# Patient Record
Sex: FEMALE | Race: WHITE | NOT HISPANIC OR LATINO | Employment: FULL TIME | URBAN - NONMETROPOLITAN AREA
[De-identification: names, ages, dates, MRNs, and addresses within clinical notes are randomized per-mention and may not be internally consistent; named-entity substitution may affect disease eponyms.]

---

## 2017-03-14 ENCOUNTER — OFFICE VISIT (OUTPATIENT)
Dept: OTOLARYNGOLOGY | Facility: CLINIC | Age: 47
End: 2017-03-14

## 2017-03-14 VITALS — BODY MASS INDEX: 29.45 KG/M2 | WEIGHT: 150 LBS | TEMPERATURE: 98.5 F | HEIGHT: 60 IN

## 2017-03-14 DIAGNOSIS — K11.21 ACUTE SIALOADENITIS: Primary | ICD-10-CM

## 2017-03-14 PROCEDURE — 99243 OFF/OP CNSLTJ NEW/EST LOW 30: CPT | Performed by: OTOLARYNGOLOGY

## 2017-03-14 RX ORDER — DOXYCYCLINE HYCLATE 100 MG
100 TABLET ORAL 2 TIMES DAILY
COMMUNITY
End: 2017-12-12

## 2017-03-14 RX ORDER — CEFUROXIME AXETIL 500 MG/1
TABLET ORAL
Refills: 0 | COMMUNITY
Start: 2017-02-10 | End: 2017-03-29

## 2017-03-14 NOTE — PROGRESS NOTES
Subjective   Nimco Bocanegra is a 47 y.o. female.   This is a consultation from Dr. Mitchell  History of Present Illness   Patient reports that on March 8 she had an upper respiratory infection.  On March 11 she felt a swelling under her tongue that was tender.  Was given Ceftin.  By the evening of Sunday, March 12 she had a small not under her jaw and then yesterday morning the knot was very large.  Saw Dr. Mitchell who added doxycycline to her medical regimen.  Patient reports that since yesterday swelling is decreased by approximately half.  She's never had anything like this before.  It did not seem to be brought on by eating or any other activity.  She had fever with her sinus infection but is not running a fever now..  No trouble swallowing or breathing.    The following portions of the patient's history were reviewed and updated as appropriate: allergies, current medications, past family history, past medical history, past social history, past surgical history and problem list.      Nimco Bocanegra reports that she has never smoked. She has never used smokeless tobacco. She reports that she does not drink alcohol or use illicit drugs.  Patient is not a tobacco user and has not been counseled for use of tobacco products    Family History   Problem Relation Age of Onset   • Diabetes Mother    • Cancer Mother    • Thyroid disease Mother    • Diabetes Father    • Heart disease Father    • Diabetes Sister    • Cancer Paternal Grandmother          Current Outpatient Prescriptions:   •  albuterol (PROAIR HFA) 108 (90 BASE) MCG/ACT inhaler, Inhale 2 puffs Every 6 (Six) Hours., Disp: , Rfl:   •  cefuroxime (CEFTIN) 500 MG tablet, TAKE 1 TABLET BY MOUTH TWICE DAILY FOR 10 DAYS, Disp: , Rfl: 0  •  cetirizine (zyrTEC) 10 MG tablet, Take 10 mg by mouth Daily., Disp: , Rfl:   •  doxycycline (VIBRAMYICN) 100 MG tablet, Take 100 mg by mouth 2 (Two) Times a Day., Disp: , Rfl:   •  raNITIdine (ZANTAC) 150  MG tablet, Take 150 mg by mouth Daily., Disp: , Rfl:   •  simvastatin (ZOCOR) 20 MG tablet, Take 1 tablet by mouth Every Night., Disp: 30 tablet, Rfl: 12      Past Medical History   Diagnosis Date   • Asthma    • Hyperlipidemia    • Prediabetes    • Screening examination for pulmonary tuberculosis          Review of Systems   Constitutional: Positive for fever.   HENT: Positive for sore throat.    Respiratory: Positive for cough.    All other systems reviewed and are negative.          Objective   Physical Exam  General: Well-developed well-nourished female in no acute distress.  Alert and oriented ×3. Head: Normocephalic. Face: Symmetrical strength and appearance. PERRL. EOMI. Voice:Strong. Speech:Fluent  Ears: External ears no deformity, canals no discharge, tympanic membranes intact clear and mobile bilaterally.  Nose: Nares show no discharge mass polyp or purulence.  Boggy mucosa is present.  No gross external deformity.  Septum: Midline  Oral cavity: Lips and gums without lesions.  Tongue and floor of mouth without lesions.  Parotid and left submandibular duct or unobstructed.  Right submandibular duct shows erythema at the distal end of the duct but no palpable stone.  Bimanual palpation reveals tenderness and enlargement of the right submandibular gland but again no palpable stone in the proximal duct.  No mucosal lesions on the buccal mucosa or vestibule of the mouth.  Pharynx: No erythema exudate mass or ulcer  Neck: No lymphadenopathy.  No thyromegaly.  Trachea and larynx midline.  Right submandibular gland is enlarged and tender to palpation.  No fluctuance.  No masses in either parotid or the left submandibular gland.        Assessment/Plan   Nimco was seen today for neck mass and mouth lesions.    Diagnoses and all orders for this visit:    Acute sialoadenitis        Plan: Given that the patient is already starting to respond to therapy I recommended continuing the Ceftin orally and increasing oral  fluid intake and using lemon drops or other sour material to stimulate salivary flow.  If she continues to improve she is to return in 2 weeks but call sooner for problems.

## 2017-03-29 ENCOUNTER — OFFICE VISIT (OUTPATIENT)
Dept: OTOLARYNGOLOGY | Facility: CLINIC | Age: 47
End: 2017-03-29

## 2017-03-29 VITALS — HEIGHT: 60 IN | WEIGHT: 150 LBS | BODY MASS INDEX: 29.45 KG/M2 | TEMPERATURE: 98.5 F

## 2017-03-29 DIAGNOSIS — K11.20 SIALADENITIS: Primary | ICD-10-CM

## 2017-03-29 PROCEDURE — 99213 OFFICE O/P EST LOW 20 MIN: CPT | Performed by: OTOLARYNGOLOGY

## 2017-03-29 NOTE — PROGRESS NOTES
Subjective   Nimconadia Bocanegra is a 47 y.o. female.       History of Present Illness   Patient was seen previously with what appeared to be an acute right submandibular sialadenitis.  She completed her course of antibiotics and returns for reevaluation.  States she is much improved.  Had 1 episode when she felt some brief swelling under her jaw but used sour candy and this improved rapidly.  No fever      The following portions of the patient's history were reviewed and updated as appropriate: allergies, current medications, past family history, past medical history, past social history, past surgical history and problem list.     reports that she has never smoked. She has never used smokeless tobacco. She reports that she does not drink alcohol or use illicit drugs.   Patient is not a tobacco user and has not been counseled for use of tobacco products      Review of Systems   Constitutional: Negative for fever.           Objective   Physical Exam  Right submandibular gland is no longer enlarged or tender.  There is clear salivary flow from the submandibular ducts and parotid ducts bilaterally.  No palpable stone.      Assessment/Plan   Nimco was seen today for follow-up.    Diagnoses and all orders for this visit:    Sialadenitis      Plan: Recommended the patient continue mildly increased oral intake of water for the next 2-3 weeks along with use of salivary stimulating material 1-2 times a day.  If no further symptoms may try to discontinue these measures and follow me as needed for recurrence.

## 2017-12-12 ENCOUNTER — OFFICE VISIT (OUTPATIENT)
Dept: CARDIOLOGY | Facility: CLINIC | Age: 47
End: 2017-12-12

## 2017-12-12 VITALS
HEART RATE: 79 BPM | WEIGHT: 150 LBS | DIASTOLIC BLOOD PRESSURE: 78 MMHG | HEIGHT: 60 IN | SYSTOLIC BLOOD PRESSURE: 128 MMHG | BODY MASS INDEX: 29.45 KG/M2

## 2017-12-12 DIAGNOSIS — J45.20 MILD INTERMITTENT ASTHMA WITHOUT COMPLICATION: ICD-10-CM

## 2017-12-12 DIAGNOSIS — R73.03 PREDIABETES: Primary | ICD-10-CM

## 2017-12-12 DIAGNOSIS — R00.2 PALPITATION: ICD-10-CM

## 2017-12-12 DIAGNOSIS — E78.49 OTHER HYPERLIPIDEMIA: ICD-10-CM

## 2017-12-12 DIAGNOSIS — R53.83 OTHER FATIGUE: ICD-10-CM

## 2017-12-12 DIAGNOSIS — R07.2 PRECORDIAL PAIN: ICD-10-CM

## 2017-12-12 PROCEDURE — 93000 ELECTROCARDIOGRAM COMPLETE: CPT | Performed by: INTERNAL MEDICINE

## 2017-12-12 PROCEDURE — 99204 OFFICE O/P NEW MOD 45 MIN: CPT | Performed by: INTERNAL MEDICINE

## 2017-12-12 NOTE — PROGRESS NOTES
Nimco Isaac Bocanegra  47 y.o. female    12/12/2017  1. Prediabetes    2. Other hyperlipidemia    3. Mild intermittent asthma without complication    4. Other fatigue    5. Precordial pain    6. Palpitation        History of Present Illness:Chest pain and Palpitation    47 years old patient with a history of hyperlipidemia, bronchial asthma, fluctuating heart rate and chest pain described as pressure-like feeling radiating to left shoulder for 2-3 month.  No syncope reported.  Had episode of dizziness.  Noted fluctuating heart rate.  EKG in the office normal sinus rhythm.  Her father had 2 heart attack at age of mid 50 and he was smoker.  She denies orthopnea, PND, nauseous, vomiting.  Denied fever cough which appeared delay dysuria or hematuria.  Denies intermittent claudications.  EKG done and finding discussed the patient in normal sinus rhythm.Pre Diabetic with hemoglobin A1c 5.6            SUBJECTIVE    Allergies   Allergen Reactions   • Bactrim [Sulfamethoxazole-Trimethoprim]    • Penicillins Hives         Past Medical History:   Diagnosis Date   • Asthma    • Hyperlipidemia    • Prediabetes    • Screening examination for pulmonary tuberculosis          Past Surgical History:   Procedure Laterality Date   • GALLBLADDER SURGERY     • TOTAL ABDOMINAL HYSTERECTOMY WITH SALPINGO OOPHORECTOMY  2003         Family History   Problem Relation Age of Onset   • Diabetes Mother    • Cancer Mother    • Thyroid disease Mother    • Diabetes Father    • Heart disease Father    • Diabetes Sister    • Cancer Paternal Grandmother          Social History     Social History   • Marital status:      Spouse name: N/A   • Number of children: N/A   • Years of education: N/A     Occupational History   • Not on file.     Social History Main Topics   • Smoking status: Never Smoker   • Smokeless tobacco: Never Used   • Alcohol use No      Comment: socially   • Drug use: No   • Sexual activity: Yes     Other Topics Concern   •  "Not on file     Social History Narrative         Current Outpatient Prescriptions   Medication Sig Dispense Refill   • albuterol (PROAIR HFA) 108 (90 BASE) MCG/ACT inhaler Inhale 2 puffs Every 6 (Six) Hours.     • cetirizine (zyrTEC) 10 MG tablet Take 10 mg by mouth Daily.     • simvastatin (ZOCOR) 20 MG tablet Take 1 tablet by mouth Every Night. 30 tablet 12     No current facility-administered medications for this visit.          OBJECTIVE    /78  Pulse 79  Ht 152.4 cm (60\")  Wt 68 kg (150 lb)  BMI 29.29 kg/m2        Review of Systems     Constitutional:  Denies recent weight loss, weight gain, fever or chills, no change in exercise tolerance     HENT:  Denies any hearing loss, epistaxis, hoarseness, or difficulty speaking.     Eyes: No blurry    Respiratory: History of bronchial asthma    Cardiovascular: See H&P    Gastrointestinal:  Denies change in bowel habits, dyspepsia, ulcer disease, hematochezia, or melena.     Endocrine: Negative for cold intolerance, heat intolerance, polydipsia, polyphagia and polyuria. Denies any history of weight change, heat/cold intolerance, polydipsia, polyuria     Genitourinary: Negative.      Musculoskeletal: Denies any history of arthritic symptoms or back problems     Skin:  Denies any change in hair or nails, rashes, or skin lesions.     Allergic/Immunologic: Negative.  Negative for environmental allergies, food allergies and immunocompromised state.     Neurological:  Denies any history of recurrent headaches, strokes, TIA, or seizure disorder.     Hematological: Denies any food allergies, seasonal allergies, bleeding disorders, or lymphadenopathy.     Psychiatric/Behavioral: Denies any history of depression, substance abuse, or change in cognitive function.         Physical Exam     Constitutional: Cooperative, alert and oriented, well-developed, well-nourished, in no acute distress.     HENT:   Head: Normocephalic, normal hair patterns, no masses or " tenderness.  Ears, Nose, and Throat: No gross abnormalities. No pallor or cyanosis. Dentition good.   Eyes: EOMS intact, PERRL, conjunctivae and lids unremarkable. Fundoscopic exam and visual fields not performed.   Neck: No palpable masses or adenopathy, no thyromegaly, no JVD, carotid pulses are full and equal bilaterally and without  Bruits.     Cardiovascular: Regular rhythm, S1 and S2 normal, no S3 or S4. Apical impulse not displaced. No murmurs, gallops, or rubs detected.     Pulmonary/Chest: Chest: normal symmetry, no tenderness to palpation, normal respiratory excursion, no intercostal retraction, no use of accessory muscles.            Pulmonary: Normal breath sounds. No rales or ronchi.    Abdominal: Abdomen soft, bowel sounds normoactive, no masses, no hepatosplenomegaly, non-tender, no bruits.     Musculoskeletal: No deformities, clubbing, cyanosis, erythema, or edema observed. There are no spinal abnormalities noted. Normal muscle strength and tone. Pulses full and equal in all extremities, no bruits auscultated.     Neurological: No gross motor or sensory deficits noted, affect appropriate, oriented to time, person, place.     Skin: Warm and dry to the touch, no apparent skin lesions or masses noted.     Psychiatric: She has a normal mood and affect. Her behavior is normal. Judgment and thought content normal.           ECG 12 Lead  Date/Time: 12/12/2017 1:48 PM  Performed by: TASH SOUSA  Authorized by: TASH SOUSA   Comparison: not compared with previous ECG   Rhythm: sinus rhythm              No results found for: WBC, HGB, HCT, MCV, PLT  Lab Results   Component Value Date    GLUCOSE 94 12/14/2016    BUN 15 12/14/2016    CREATININE 0.8 12/14/2016    CO2 30 12/14/2016    CALCIUM 9.8 12/14/2016    ALBUMIN 4.5 12/14/2016    AST 33 12/14/2016    ALT 36 12/14/2016     No results found for: CHOL  Lab Results   Component Value Date    TRIG 164 12/14/2016     Lab Results   Component Value Date     HDL 50 (L) 12/14/2016     Lab Results   Component Value Date    LDLCALC 98 12/14/2016     No results found for: LDL  No results found for: HDLLDLRATIO  No components found for: CHOLHDL  Lab Results   Component Value Date    HGBA1C 6.0 (H) 12/14/2016     No results found for: TSH, I6VCDZQ, M3YHDTP, THYROIDAB        ASSESSMENT AND PLAN  #1 chest pain #2 palpitations #3 hyperlipidemia    Clinically, no evidence of ongoing ischemia at the time of evaluation.  An no sign of any cardiac decompensation.  EKG done and finding discussed with the patient.  She had chest pain described as pressure-like feeling over the left precordium with element of reproducibility on palpation.  We'll arrange a treadmill stress test and the family history of heart disease and echocardiographic study to evaluate systolic function.  We'll also get to 24-48 hour Holter monitor given history of fluctuating heart rate.  Risk factor lifestyle modification discussed the patient.-Dash diett explained.  We will see her back in 4 to 6 month R depends on patient clinical conditions.  Recommend to continue Zocor for management of hyperlipidemia prior for asthma    Nimco was seen today for chest pain, shortness of breath and loss of consciousness.    Diagnoses and all orders for this visit:    Prediabetes    Other hyperlipidemia    Mild intermittent asthma without complication    Other fatigue    Precordial pain  -     ECG 12 Lead  -     Holter Monitor - 48 Hour; Future  -     Adult Transthoracic Echo Complete W/ Cont if Necessary Per Protocol; Future  -     Treadmill Stress Test; Future    Palpitation  -     ECG 12 Lead  -     Holter Monitor - 48 Hour; Future  -     Adult Transthoracic Echo Complete W/ Cont if Necessary Per Protocol; Future        Anrdea Blood MD  12/12/2017  1:42 PM

## 2018-02-12 ENCOUNTER — OFFICE VISIT (OUTPATIENT)
Dept: OTOLARYNGOLOGY | Facility: CLINIC | Age: 48
End: 2018-02-12

## 2018-02-12 VITALS — BODY MASS INDEX: 27.72 KG/M2 | OXYGEN SATURATION: 97 % | WEIGHT: 146.8 LBS | HEIGHT: 61 IN | HEART RATE: 80 BPM

## 2018-02-12 DIAGNOSIS — R59.0 REACTIVE LYMPHOID HYPERPLASIA (RLH) OF HEAD, FACE AND NECK: Primary | ICD-10-CM

## 2018-02-12 PROCEDURE — 99214 OFFICE O/P EST MOD 30 MIN: CPT | Performed by: OTOLARYNGOLOGY

## 2018-02-12 RX ORDER — CEFUROXIME AXETIL 250 MG/1
250 TABLET ORAL 2 TIMES DAILY
Qty: 20 TABLET | Refills: 0 | Status: SHIPPED | OUTPATIENT
Start: 2018-02-12 | End: 2018-04-13

## 2018-02-12 RX ORDER — MOMETASONE FUROATE 50 UG/1
1 SPRAY, METERED NASAL DAILY
COMMUNITY

## 2018-02-13 NOTE — PROGRESS NOTES
Subjective   Nimco Isaac Bocanegra is a 48 y.o. female.       History of Present Illness   I saw this patient previously with what appeared to be acute right submandibular sialadenitis which improved with treatment.  Returns today because she is had a mass in her face that is somewhat painful.  Her dentist suggested this might be her parotid gland.  Has a second area along her jawline on the right that is been present about the same amount of time, which is to say a few weeks..  This is not varied with eating or chewing.  No associated fever.  Has had some upper respiratory symptoms recently.      The following portions of the patient's history were reviewed and updated as appropriate: allergies, current medications, past family history, past medical history, past social history, past surgical history and problem list.     reports that she has never smoked. She has never used smokeless tobacco. She reports that she does not drink alcohol or use illicit drugs.   Patient is not a tobacco user and has not been counseled for use of tobacco products      Review of Systems   Constitutional: Negative for chills and fever.   HENT: Negative for sore throat.            Objective   Physical Exam  General: Well-developed well-nourished female in no acute distress.  Alert and oriented ×3. Head: Normocephalic. Face: Symmetrical strength and appearance. PERRL. EOMI. Voice:Strong. Speech:Fluent  Ears: External ears no deformity, canals no discharge, tympanic membranes intact clear and mobile bilaterally.  Nose: Nares show no discharge mass polyp or purulence.  Boggy mucosa is present.  No gross external deformity.  Septum: Midline  Oral cavity: Lips and gums without lesions.  Tongue and floor of mouth without lesions.  Parotid and submandibular ducts unobstructed.  No mucosal lesions on the buccal mucosa or vestibule of the mouth.  The mass in question is palpable just anterior to the masseter and substance of the parotid in the  buccal fat pad the right.  It is rubbery and mobile consistent with a fascial chain lymph node.  There is a similar smaller lymph node in the facial notch of the mandible on the right.  Pharynx: No erythema exudate mass or ulcer  Neck: No lymphadenopathy in the cervical chains.  No thyromegaly.  Trachea and larynx midline.  No masses in the parotid or submandibular glands.      Assessment/Plan   Nimco was seen today for right salivary gland difficulty.    Diagnoses and all orders for this visit:    Reactive lymphoid hyperplasia (RLH) of head, face and neck    Other orders  -     cefuroxime (CEFTIN) 250 MG tablet; Take 1 tablet by mouth 2 (Two) Times a Day.          Plan: Reassured the patient that I don't think this is her saliva gland but rather reactive lymphadenopathy.  We'll prescribe Ceftin which she has tolerated previously for 10 days and have her return in 1 month for reevaluation.  Call sooner for problems or worsening symptoms.

## 2018-02-21 ENCOUNTER — HOSPITAL ENCOUNTER (OUTPATIENT)
Dept: CARDIOLOGY | Facility: HOSPITAL | Age: 48
Discharge: HOME | End: 2018-02-21
Attending: INTERNAL MEDICINE | Admitting: INTERNAL MEDICINE

## 2018-02-21 DIAGNOSIS — R07.2 PRECORDIAL PAIN: ICD-10-CM

## 2018-02-21 LAB
BH CV STRESS BP STAGE 1: NORMAL
BH CV STRESS BP STAGE 2: NORMAL
BH CV STRESS BP STAGE 3: NORMAL
BH CV STRESS DURATION MIN STAGE 1: 3
BH CV STRESS DURATION MIN STAGE 2: 3
BH CV STRESS DURATION MIN STAGE 3: 3
BH CV STRESS DURATION SEC STAGE 1: 0
BH CV STRESS DURATION SEC STAGE 2: 0
BH CV STRESS DURATION SEC STAGE 3: 0
BH CV STRESS DURATION SEC STAGE 4: 39
BH CV STRESS GRADE STAGE 1: 10
BH CV STRESS GRADE STAGE 2: 12
BH CV STRESS GRADE STAGE 3: 14
BH CV STRESS GRADE STAGE 4: 16
BH CV STRESS HR STAGE 1: 144
BH CV STRESS HR STAGE 2: 166
BH CV STRESS HR STAGE 3: 181
BH CV STRESS HR STAGE 4: 190
BH CV STRESS METS STAGE 1: 5
BH CV STRESS METS STAGE 2: 7.5
BH CV STRESS METS STAGE 3: 10
BH CV STRESS METS STAGE 4: 13.5
BH CV STRESS PROTOCOL 1: NORMAL
BH CV STRESS RECOVERY BP: NORMAL MMHG
BH CV STRESS RECOVERY HR: 99 BPM
BH CV STRESS SPEED STAGE 1: 1.7
BH CV STRESS SPEED STAGE 2: 2.5
BH CV STRESS SPEED STAGE 3: 3.4
BH CV STRESS SPEED STAGE 4: 4.2
BH CV STRESS STAGE 1: 1
BH CV STRESS STAGE 2: 2
BH CV STRESS STAGE 3: 3
BH CV STRESS STAGE 4: 4
MAXIMAL PREDICTED HEART RATE: 174 BPM
PERCENT MAX PREDICTED HR: 109.2 %
STRESS BASELINE BP: NORMAL MMHG
STRESS BASELINE HR: 76 BPM
STRESS PERCENT HR: 128 %
STRESS POST ESTIMATED WORKLOAD: 11.1 METS
STRESS POST EXERCISE DUR MIN: 9 MIN
STRESS POST EXERCISE DUR SEC: 38 SEC
STRESS POST PEAK BP: NORMAL MMHG
STRESS POST PEAK HR: 190 BPM
STRESS TARGET HR: 148 BPM

## 2018-02-21 PROCEDURE — 93017 CV STRESS TEST TRACING ONLY: CPT

## 2018-02-21 PROCEDURE — 93018 CV STRESS TEST I&R ONLY: CPT | Performed by: INTERNAL MEDICINE

## 2018-02-21 PROCEDURE — 93016 CV STRESS TEST SUPVJ ONLY: CPT | Performed by: INTERNAL MEDICINE

## 2018-02-23 ENCOUNTER — TELEPHONE (OUTPATIENT)
Dept: CARDIOLOGY | Facility: CLINIC | Age: 48
End: 2018-02-23

## 2018-02-23 ENCOUNTER — DOCUMENTATION (OUTPATIENT)
Dept: CARDIOLOGY | Facility: CLINIC | Age: 48
End: 2018-02-23

## 2018-03-15 ENCOUNTER — OFFICE VISIT (OUTPATIENT)
Dept: OTOLARYNGOLOGY | Facility: CLINIC | Age: 48
End: 2018-03-15

## 2018-03-15 VITALS — OXYGEN SATURATION: 99 % | WEIGHT: 141.8 LBS | HEIGHT: 62 IN | BODY MASS INDEX: 26.09 KG/M2

## 2018-03-15 DIAGNOSIS — Z09 RESOLVED CONDITION, FOLLOW-UP: Primary | ICD-10-CM

## 2018-03-15 PROCEDURE — 99213 OFFICE O/P EST LOW 20 MIN: CPT | Performed by: OTOLARYNGOLOGY

## 2018-03-19 NOTE — PROGRESS NOTES
Ankush Basilioci Isaac Bocanegra is a 48 y.o. female.       History of Present Illness   Patient previously had a history of acute right submandible or sialadenitis that it improved with treatment. I saw her on 2/12/18 with what appeared to be a right facial chain lymph node and a lymph node in the buccal fat pad.  She was given Ceftin to take.  She thinks the mass is unchanged.  She is not running any fever.  No difficulty eating chewing or swallowing.      The following portions of the patient's history were reviewed and updated as appropriate: allergies, current medications, past family history, past medical history, past social history, past surgical history and problem list.     reports that she has never smoked. She has never used smokeless tobacco. She reports that she does not drink alcohol or use drugs.   Patient is not a tobacco user and has not been counseled for use of tobacco products      Review of Systems   Constitutional: Negative for fever.           Objective   Physical Exam  General: Well-developed well-nourished female in no acute distress.  Alert and oriented ×3. Head: Normocephalic. Face: Symmetrical strength and appearance. PERRL. EOMI. Voice:Strong. Speech:Fluent  Ears: External ears no deformity, canals no discharge, tympanic membranes intact clear and mobile bilaterally.  Nose: Nares show no discharge mass polyp or purulence.  Boggy mucosa is present.  No gross external deformity.  Septum: Midline  Oral cavity: Lips and gums without lesions.  Tongue and floor of mouth without lesions.  Parotid and submandibular ducts unobstructed.  No mucosal lesions on the buccal mucosa or vestibule of the mouth.  The previously palpable mass is no longer noted.  What the patient is perceiving as the mass today is the anterior aspect of her masseter muscle which I don't think has any pathologic abnormality  Pharynx: No erythema exudate mass or ulcer  Neck: No lymphadenopathy.  This typically I can no  longer palpate the facial chain node.  No thyromegaly.  Trachea and larynx midline.  No masses in the parotid or submandibular glands.      Assessment/Plan   Nimco was seen today for follow-up.    Diagnoses and all orders for this visit:    Resolved condition, follow-up      Plan: I told the patient to my exam the nodes have resolved.  I don't think any additional treatment is needed.  Told her that I would not schedule an appointment with her but she should call if she felt any recurrence or worrisome changes and I would be glad to see her.

## 2018-04-13 ENCOUNTER — OFFICE VISIT (OUTPATIENT)
Dept: CARDIOLOGY | Facility: CLINIC | Age: 48
End: 2018-04-13

## 2018-04-13 VITALS
HEIGHT: 62 IN | DIASTOLIC BLOOD PRESSURE: 66 MMHG | HEART RATE: 85 BPM | BODY MASS INDEX: 24.66 KG/M2 | WEIGHT: 134 LBS | SYSTOLIC BLOOD PRESSURE: 98 MMHG

## 2018-04-13 DIAGNOSIS — J45.20 MILD INTERMITTENT ASTHMA WITHOUT COMPLICATION: ICD-10-CM

## 2018-04-13 DIAGNOSIS — R07.2 PRECORDIAL PAIN: ICD-10-CM

## 2018-04-13 DIAGNOSIS — E78.49 OTHER HYPERLIPIDEMIA: Primary | ICD-10-CM

## 2018-04-13 DIAGNOSIS — R00.2 PALPITATION: ICD-10-CM

## 2018-04-13 DIAGNOSIS — R53.83 OTHER FATIGUE: ICD-10-CM

## 2018-04-13 PROCEDURE — 99213 OFFICE O/P EST LOW 20 MIN: CPT | Performed by: INTERNAL MEDICINE

## 2018-04-13 NOTE — PROGRESS NOTES
Nimco Isaac Bocanegra  48 y.o. female    04/13/2018  1. Other hyperlipidemia    2. Mild intermittent asthma without complication    3. Palpitation    4. Precordial pain    5. Other fatigue        History of Present Illness    48 years old patient with a history of hyperlipidemia, bronchial asthma, fluctuating heart rate and chest pain described as pressure-like feeling radiating to left shoulder for 2-3 month.  No syncope reported.  Had episode of dizziness.  Noted fluctuating heart rate.  EKG in the office normal sinus rhythm.  Her father had 2 heart attack at age of mid 50 and he was smoker.  She denies orthopnea, PND, nauseous, vomiting.  Denied fever cough which appeared delay dysuria or hematuria.  Denies intermittent claudications. She mentioned to palpitation described as skipping.  No further chest pain reported.  She fortunately she lost quite a bit weight.  Results of stress test echo and Holter discussed with the patient.  Risk factor lifestyle modification discussed.    STRESS TEST 2/2018  Exercised per solo protocal for 9; 38 MIN mets ACHIEVED 11.10 MIN. WITH TARGET HEART RATE AND HYPERTENSIVE BP RESPONSE. GOOD FUNCTIONAL CAPACITY. NO ST/T CHANGES INDICTIVE OF ISCHEMIA    ECHO 2/2018  · Left ventricular systolic function is normal. Estimated EF = 60%.    Mitral Valve The mitral valve is grossly normal in structure. Trace mitral valve regurgitation is present.      Tricuspid Valve The tricuspid valve is grossly normal. Trace tricuspid valve regurgitation is present. Estimated right ventricular systolic pressure from tricuspid regurgitation is normal (<35 mmHg).        HOLTER 12/2017  Predominant rhythm is sinus with average heart rate of 85 minimum 60 maximum 140 to 145 bpm.  There is baseline artifact.  No supraventricular or ventricular immature complex noted.  No significant bradyarrhythmia or any pause noted.  SUBJECTIVE    Allergies   Allergen Reactions   • Penicillins Hives   • Bactrim  "[Sulfamethoxazole-Trimethoprim] Rash         Past Medical History:   Diagnosis Date   • Asthma    • Hyperlipidemia    • Prediabetes    • Screening examination for pulmonary tuberculosis          Past Surgical History:   Procedure Laterality Date   • GALLBLADDER SURGERY     • TOTAL ABDOMINAL HYSTERECTOMY WITH SALPINGO OOPHORECTOMY  2003         Family History   Problem Relation Age of Onset   • Diabetes Mother    • Cancer Mother    • Thyroid disease Mother    • Diabetes Father    • Heart disease Father    • Diabetes Sister    • Cancer Paternal Grandmother          Social History     Social History   • Marital status:      Spouse name: N/A   • Number of children: N/A   • Years of education: N/A     Occupational History   • Not on file.     Social History Main Topics   • Smoking status: Never Smoker   • Smokeless tobacco: Never Used   • Alcohol use No   • Drug use: No   • Sexual activity: Defer     Other Topics Concern   • Not on file     Social History Narrative   • No narrative on file         Current Outpatient Prescriptions   Medication Sig Dispense Refill   • albuterol (PROAIR HFA) 108 (90 BASE) MCG/ACT inhaler Inhale 2 puffs Every 6 (Six) Hours As Needed.     • cetirizine (zyrTEC) 10 MG tablet Take 10 mg by mouth Daily.     • mometasone (NASONEX) 50 MCG/ACT nasal spray 1 spray into each nostril Daily.     • simvastatin (ZOCOR) 20 MG tablet Take 1 tablet by mouth Every Night. 30 tablet 12     No current facility-administered medications for this visit.          OBJECTIVE    BP 98/66   Pulse 85   Ht 157.5 cm (62\")   Wt 60.8 kg (134 lb)   BMI 24.51 kg/m²         Review of Systems     Constitutional:  Denies recent weight loss, weight gain, fever or chills, no change in exercise tolerance     HENT:  Denies any hearing loss, epistaxis, hoarseness, or difficulty speaking.     Eyes: No blurring     Respiratory:  Denies dyspnea with exertion,no cough, wheezing, or hemoptysis.     Cardiovascular: See H&P. "     Gastrointestinal:  Denies change in bowel habits, dyspepsia, ulcer disease, hematochezia, or melena.     Endocrine: Negative for cold intolerance, heat intolerance, polydipsia, polyphagia and polyuria. Denies any history of weight change, heat/cold intolerance, polydipsia, polyuria     Genitourinary: Negative.      Musculoskeletal: Denies any history of arthritic symptoms or back problems     Skin:  Denies any change in hair or nails, rashes, or skin lesions.     Allergic/Immunologic: Negative.  Negative for environmental allergies, food allergies and immunocompromised state.     Neurological:  Denies any history of recurrent headaches, strokes, TIA, or seizure disorder.     Hematological: Denies any food allergies, seasonal allergies, bleeding disorders, or lymphadenopathy.     Psychiatric/Behavioral: Denies any history of depression, substance abuse, or change in cognitive function.         Physical Exam     Constitutional: Cooperative, alert and oriented, well-developed, well-nourished, in no acute distress.     HENT:   Head: Normocephalic, normal hair patterns, no masses or tenderness.  Ears, Nose, and Throat: No gross abnormalities. No pallor or cyanosis. Dentition good.   Eyes: EOMS intact, PERRL, conjunctivae and lids unremarkable. Fundoscopic exam and visual fields not performed.   Neck: No palpable masses or adenopathy, no thyromegaly, no JVD, carotid pulses are full and equal bilaterally and without  Bruits.     Cardiovascular: Regular rhythm, S1 and S2 normal, no S3 or S4. Apical impulse not displaced. No murmurs, gallops, or rubs detected.     Pulmonary/Chest: Chest: normal symmetry, no tenderness to palpation, normal respiratory excursion, no intercostal retraction, no use of accessory muscles.            Pulmonary: Normal breath sounds. No rales or ronchi.    Abdominal: Abdomen soft, bowel sounds normoactive, no masses, no hepatosplenomegaly, non-tender, no bruits.     Musculoskeletal: No  deformities, clubbing, cyanosis, erythema, or edema observed. There are no spinal abnormalities noted. Normal muscle strength and tone. Pulses full and equal in all extremities, no bruits auscultated.     Neurological: No gross motor or sensory deficits noted, affect appropriate, oriented to time, person, place.     Skin: Warm and dry to the touch, no apparent skin lesions or masses noted.     Psychiatric: She has a normal mood and affect. Her behavior is normal. Judgment and thought content normal.         Procedures      No results found for: WBC, HGB, HCT, MCV, PLT  Lab Results   Component Value Date    GLUCOSE 94 12/14/2016    BUN 15 12/14/2016    CREATININE 0.8 12/14/2016    CO2 30 12/14/2016    CALCIUM 9.8 12/14/2016    ALBUMIN 4.5 12/14/2016    AST 33 12/14/2016    ALT 36 12/14/2016     No results found for: CHOL  Lab Results   Component Value Date    TRIG 164 12/14/2016     Lab Results   Component Value Date    HDL 50 (L) 12/14/2016     No components found for: LDLCALC  Lab Results   Component Value Date    LDL 98 12/14/2016     No results found for: HDLLDLRATIO  No components found for: CHOLHDL  Lab Results   Component Value Date    HGBA1C 6.0 (H) 12/14/2016     No results found for: TSH, E5WIJFW, X5PQZQF, THYROIDAB        ASSESSMENT AND PLAN  #1 chest pain #2 palpitations #3 hyperlipidemia    Clinically, no sign of cardiac decompensation based on the clinical history physical finding.  Results of stress test echo reported discussed with the patient.  Mild mitral and tricuspid regurgitation with good heart function.  Patient will continue simvastatin for management of hyperlipidemia.  She is on Zyrtec with history of seasonal allergic.  Fortunately she lost quite a bit weight.  We'll see her in one year R depends on patient clinical conditions.    Nimco was seen today for follow-up.    Diagnoses and all orders for this visit:    Other hyperlipidemia    Mild intermittent asthma without  complication    Palpitation    Precordial pain    Other fatigue        Andrea Blood MD  4/13/2018  9:36 AM

## 2019-06-28 ENCOUNTER — TELEPHONE (OUTPATIENT)
Dept: CARDIOLOGY | Facility: CLINIC | Age: 49
End: 2019-06-28

## 2019-06-28 NOTE — TELEPHONE ENCOUNTER
"Pt called back and she explained she had passed out at work but does not have a reason what caused it. She stated her bp was taken and it was 60/40 at that time. She stated she went to Dr Iyer and she stated \"he said to get in with my cardiologist as soon as possible.\" pt was told that there can be wait to see Dr. Pompa due to his high volume of patient. Pt acknowledged    ----- Message from Ethel Nielsen sent at 6/28/2019  8:24 AM CDT -----  Regarding: peña patient   Contact: [phone]  Patient called advising she called yesterday 06/27/19 and left a message about needing an appt.  She passed out at work.  She was advised by her PCP to follow-up dr pompa.  She advised due to her NOT getting a call back yesterday she is expecting a call today if not she advised she will not be back in office.      PLEASE CALL [phone]    "

## 2020-06-17 ENCOUNTER — HOSPITAL ENCOUNTER (EMERGENCY)
Facility: HOSPITAL | Age: 50
Discharge: HOME | End: 2020-06-17
Attending: EMERGENCY MEDICINE | Admitting: EMERGENCY MEDICINE

## 2020-06-17 VITALS
DIASTOLIC BLOOD PRESSURE: 66 MMHG | HEIGHT: 61 IN | BODY MASS INDEX: 25.49 KG/M2 | WEIGHT: 135 LBS | TEMPERATURE: 98.3 F | SYSTOLIC BLOOD PRESSURE: 126 MMHG | OXYGEN SATURATION: 95 % | HEART RATE: 76 BPM | RESPIRATION RATE: 18 BRPM

## 2020-06-17 DIAGNOSIS — S01.81XA LACERATION OF CHIN WITHOUT COMPLICATION, INITIAL ENCOUNTER: Primary | ICD-10-CM

## 2020-06-17 PROCEDURE — 90715 TDAP VACCINE 7 YRS/> IM: CPT | Performed by: EMERGENCY MEDICINE

## 2020-06-17 PROCEDURE — 90471 IMMUNIZATION ADMIN: CPT | Performed by: EMERGENCY MEDICINE

## 2020-06-17 PROCEDURE — 99284 EMERGENCY DEPT VISIT MOD MDM: CPT

## 2020-06-17 PROCEDURE — 25010000002 TDAP 5-2.5-18.5 LF-MCG/0.5 SUSPENSION: Performed by: EMERGENCY MEDICINE

## 2020-06-17 RX ORDER — HYDROCODONE BITARTRATE AND ACETAMINOPHEN 5; 325 MG/1; MG/1
1 TABLET ORAL ONCE
Status: COMPLETED | OUTPATIENT
Start: 2020-06-17 | End: 2020-06-17

## 2020-06-17 RX ORDER — HYDROCODONE BITARTRATE AND ACETAMINOPHEN 5; 325 MG/1; MG/1
1 TABLET ORAL EVERY 8 HOURS PRN
Qty: 9 TABLET | Refills: 0 | Status: SHIPPED | OUTPATIENT
Start: 2020-06-17

## 2020-06-17 RX ADMIN — HYDROCODONE BITARTRATE AND ACETAMINOPHEN 1 TABLET: 5; 325 TABLET ORAL at 20:38

## 2020-06-17 RX ADMIN — TETANUS TOXOID, REDUCED DIPHTHERIA TOXOID AND ACELLULAR PERTUSSIS VACCINE, ADSORBED 0.5 ML: 5; 2.5; 8; 8; 2.5 SUSPENSION INTRAMUSCULAR at 20:41
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
